# Patient Record
Sex: MALE | Race: OTHER
[De-identification: names, ages, dates, MRNs, and addresses within clinical notes are randomized per-mention and may not be internally consistent; named-entity substitution may affect disease eponyms.]

---

## 2018-10-16 ENCOUNTER — HOSPITAL ENCOUNTER (EMERGENCY)
Dept: HOSPITAL 25 - ED | Age: 18
Discharge: HOME | End: 2018-10-16
Payer: SELF-PAY

## 2018-10-16 VITALS — DIASTOLIC BLOOD PRESSURE: 81 MMHG | SYSTOLIC BLOOD PRESSURE: 111 MMHG

## 2018-10-16 DIAGNOSIS — L50.0: ICD-10-CM

## 2018-10-16 DIAGNOSIS — T78.01XA: Primary | ICD-10-CM

## 2018-10-16 DIAGNOSIS — R60.0: ICD-10-CM

## 2018-10-16 DIAGNOSIS — T78.2XXA: ICD-10-CM

## 2018-10-16 PROCEDURE — 99283 EMERGENCY DEPT VISIT LOW MDM: CPT

## 2018-10-16 PROCEDURE — 96374 THER/PROPH/DIAG INJ IV PUSH: CPT

## 2018-10-16 NOTE — ED
Allergic Reaction/Systemic





- HPI Summary


HPI Summary: 


This patient is a 18 year old M BIBA to [facility] accompanied by his friend and 

 with a chief complaint of allergic reaction to nuts. Pt had Nutella and 

has a peanut allergy. He states he made a sandwich with possibly contaminated 

Nutella and an hour later he reacted with facial swelling, SOB, hives. He has 

had similar reactions with peanuts in the past. The patient rates the pain 0/10 

in severity. Symptoms alleviated by EMS medications. He was given epi, duoneb, 

benadryl, decadron, and NS all IV by EMS. Patient still has mild facial 

swelling. Patient denies trouble breathing. Pt states he feels well currently 

but he does not have an epipen at home. 


Vital signs while in room: HR 84 bpm, /72.








- History of Current Complaint


Chief Complaint: EDAllergicReaction


Time Seen by Provider: 10/16/18 18:54


Hx Obtained From: Patient, EMS


Onset/Duration: Sudden Onset, Still Present - but improved by EMS treatment


Timing: Constant


Severity Initially: Severe


Severity Currently: Mild


Pain Intensity: 0


Pain Scale Used: 0-10 Numeric


Location: Diffuse


Character: Swelling


Alleviating Factor(s): Antihistamines, Epinephrine, Other - IV decadron, duoneb


Associated Signs And Symptoms: Positive: Cough Wheezing - wheezing, Difficulty 

Breathing, Rash - hives, Other: - facial swelling.  Negative: Throat Tightening





- Related Hx


Possible Reaction To: Food - peanuts


Prior Episode Dx as Allergic Reaction to: Same/Other: peanuts 





- Allergies/Home Medications


Allergies/Adverse Reactions: 


 Allergies











Allergy/AdvReac Type Severity Reaction Status Date / Time


 


peanut Allergy Severe Anaphylatic Verified 10/16/18 18:15





   Shock  














PMH/Surg Hx/FS Hx/Imm Hx


Endocrine/Hematology History: Reports: Other Endocrine/Hematological Disorders 

- peanut allergy 


   Denies: Hx Diabetes, Hx Sickle Cell Disease


Opthamlomology History: 


   Denies: Hx Legally Blind


EENT History: 


   Denies: Hx Deafness, Hx Hearing Problem


Neurological History: 


   Denies: Hx CVA





- Surgical History


Surgery Procedure, Year, and Place: NONE





- Immunization History


Immunizations Up to Date: Yes


Infectious Disease History: No


Infectious Disease History: 


   Denies: Traveled Outside the [place] in Last 30 Days





- Family History


Known Family History: Positive: Other - sister is allergic to peanuts 


   Negative: Blood Disorder





- Social History


Occupation: Student


Lives: Dormitory/Roommates


Alcohol Use: None


Substance Use Type: Reports: None


Smoking Status (MU): Never Smoked Tobacco





Review of Systems


Positive: Other - allergic reaction


ENT: Negative - no throat tightness or hoarseness , Other - facial swelling 


Respiratory: Other - wheezing 


Positive: Shortness Of Breath, Other - couldnt breathe from the nose 


Positive: Other - hives 


Neurological: Negative


Psychological: Normal


All Other Systems Reviewed And Are Negative: Yes





Physical Exam





- Summary


Physical Exam Summary: 








Appearance: Well-appearing, no pain distress, well-nourished, normal phonation


Skin: diffuse redness throughout, no definite hives 


Head: Normal Head/Face inspection, atraumatic


Eyes: Conjunctiva clear, bilateral eyelid swelling, diffuse facial swelling 


ENT: Normal inspection, no swelling of uvula, bifid uvula


Neck: Supple, no nodes, no JVD


Respiratory: Lungs clear, normal breath sounds, no respiratory distress


Cardio: RRR, No murmur, pulses normal, brisk capillary refill


Abdomen: Soft, nontender


Bowel sounds: Present 


Musculoskeletal: Strength Intact/ROM intact, no calf tenderness, no edema.


Psychological: Normal


Neuro: Alert, muscle tone normal, no focal deficit





Triage Information Reviewed: Yes


Vital Signs On Initial Exam: 


 Initial Vitals











Temp Pulse Resp BP Pulse Ox


 


 99.8 F   72   15   150/82   98 


 


 10/16/18 18:10  10/16/18 18:10  10/16/18 18:10  10/16/18 18:10  10/16/18 18:10











Vital Signs Reviewed: Yes





Diagnostics





- Vital Signs


 Vital Signs











  Temp Pulse Resp BP Pulse Ox


 


 10/16/18 18:10  99.8 F  72  15  150/82  98














- Laboratory


Lab Statement: Any lab studies that have been ordered have been reviewed, and 

results considered in the medical decision making process.





Re-Evaluation





- Re-Evaluation


  ** First Eval


Re-Evaluation Time: 20:00


Change: Improved


Comment: Pt has improved and I discussed discharge with him.





Allergic Reaction Course/Dx





- Course


Assessment/Plan: Pt with known peanut allergy with accidental exposure to 

peanuts arrives via EMS with allergic reaction to peanuts after being given 

epinephrine, benadryl, decadron and duoneb by EMS with improvement.  Given 

Pepcid 40mg IV in the ED and observed for 2 hours without worsening reaction.  

Patient will be discharged with prescription for prednisone, pepcid, and epipen

, and definite follow up from [facility].  Pt's  remained with pt in 

the ED and is his ride back to [place].  The patient is agreeable with this 

plan.  





- Diagnoses


Differential Diagnosis/HQI/PQRI: Positive: Anaphylaxis, Bronchospasm, Local 

Allergic Reaction, Urticaria


Provider Diagnoses: 


 Anaphylactic reaction, Peanut-induced anaphylaxis








- Critical Care Time


Critical Care Time: 30-74 min - 30 minutes





Discharge





- Sign-Out/Discharge


Documenting (check all that apply): Patient Departure





- Discharge Plan


Condition: Stable


Disposition: HOME


Prescriptions: 


EPINEPHrine [Epipen 2-Shaan] 0.3 mg IM ONCE PRN #1 inj


 PRN Reason: Allergy Symptoms


Famotidine TAB 40 MG(NF) [Pepcid TAB 40 MG(NF)] 40 mg PO DAILY #5 tab


predniSONE TAB* [Deltasone 20 MG TAB*] 40 mg PO DAILY #10 tab


Patient Education Materials:  Anaphylaxis (ED)


Forms:  *Gen. Provider Communication


Referrals: 


[facility] - Buster SHERIDAN [Medical Doctor] - 1 Day


Additional Instructions: 


You were given adrenaline (epinephrine), duoneb breathing treatment, benadryl, 

and steroids by the ambulance for anaphylactic allergic reaction to peanuts.  

You were given pepcid 40mg IV by the ER for continued histamine blocking.  


You need to take benadryl 50mg four times a day for the next 48 hrs, and then 

as needed for allergic symptoms.


You also need to start pepcid 40mg and prednisone 40mg, both tomorrow, once a 

day for a total of 5 days.


Consider referral to an allergist.


Always carry an epipen with you.


Avoid any contact with peanuts.


Return to the ER if you have any new or worsening symptoms.


 





- Billing Disposition and Condition


Condition: STABLE


Disposition: Home





- Attestation Statements


Document Initiated by Wingibe: Yes


Documenting Scribe: Fritz Gaston


Provider For Whom Alec is Documenting (Include Credential): Dr. Quin Babin MD


Scribe Attestation: 


Fritz COLLINS scribed for Dr. Quin Babin MD on 10/19/18 at 0832. 


Scribe Documentation Reviewed: Yes


Provider Attestation: 


The documentation as recorded by the Fritz barragan accurately reflects 

the service I personally performed and the decisions made by me, Dr. Quin Babin MD